# Patient Record
(demographics unavailable — no encounter records)

---

## 2018-03-29 NOTE — RADIOLOGY REPORT (SQ)
EXAM DESCRIPTION:  ANKLE RIGHT COMPLETE



COMPLETED DATE/TIME:  3/29/2018 11:55 am



REASON FOR STUDY:  s/p fall



COMPARISON:  None.



NUMBER OF VIEWS:  Three views.



TECHNIQUE:  AP, lateral, and oblique radiographic images acquired of the right ankle.



LIMITATIONS:  None.



FINDINGS:  MINERALIZATION: Normal.

BONES: No acute fracture or dislocation.  No worrisome bone lesions.

JOINTS: No effusions.

SOFT TISSUES: No soft tissue swelling. No foreign body.

OTHER: No other significant finding.



IMPRESSION:  NEGATIVE STUDY OF THE RIGHT ANKLE. NO RADIOGRAPHIC EVIDENCE OF ACUTE INJURY.



TECHNICAL DOCUMENTATION:  JOB ID:  [id number]

 2011 [employer]- All Rights Reserved



Reading location - IP/workstation name: [email or web address]

## 2018-03-29 NOTE — ER DOCUMENT REPORT
ED General





- General


Chief Complaint: Ankle Injury


Stated Complaint: FOOT PAIN


Time Seen by Provider: 03/29/18 11:25


Mode of Arrival: Ambulatory


Information source: Patient, Parent


Notes: 





Patient is a 12-year-old female who is complaining of right foot and ankle pain 

that started 2 days ago.  She states she was running and felt a pop and a 

tingling sensation to her ankle.  She has been ambulatory but occasionally pain 

is worse with ambulation.  She denies any swelling or bruising.  She has not 

taken any medication for this.  Otherwise doing well.


TRAVEL OUTSIDE OF THE [place] IN LAST 30 DAYS: No





- Related Data


Allergies/Adverse Reactions: 


 





No Known Allergies Allergy (Verified 03/29/18 10:34)


 











Past Medical History





- General


Information source: Patient





- Social History


Smoking Status: Never Smoker


Chew tobacco use (# tins/day): No


Frequency of alcohol use: None


Drug Abuse: None


Family History: Reviewed & Not Pertinent


Patient has suicidal ideation: No


Patient has homicidal ideation: No





- Past Medical History


Cardiac Medical History: 


   Denies: Hx Coronary Artery Disease, Hx Heart Attack, Hx Hypertension


Pulmonary Medical History: 


   Denies: Hx Asthma, Hx Bronchitis, Hx COPD, Hx Pneumonia


Neurological Medical History: Denies: Hx Cerebrovascular Accident, Hx Seizures


Renal/ Medical History: Denies: Hx Peritoneal Dialysis


GI Medical History: Denies: Hx Hepatitis, Hx Hiatal Hernia, Hx Ulcer


Musculoskeltal Medical History: Denies Hx Arthritis


Psychiatric Medical History: Reports: Hx Attention Deficit Hyperactivity 

Disorder


Infectious Medical History: Denies: Hx Hepatitis, Hx MRSA


Past Surgical History: Reports: Hx Orthopedic Surgery - left wrist, Hx 

Tonsillectomy - adenoids.  Denies: Hx Mastectomy, Hx Open Heart Surgery, Hx 

Pacemaker





- Immunizations


Immunizations up to date: Yes


Hx Diphtheria, Pertussis, Tetanus Vaccination: Yes





Review of Systems





- Review of Systems


Constitutional: No symptoms reported


EENT: No symptoms reported


Cardiovascular: No symptoms reported


Respiratory: No symptoms reported


Gastrointestinal: No symptoms reported


Genitourinary: No symptoms reported


Female Genitourinary: No symptoms reported


Musculoskeletal: See HPI


Skin: No symptoms reported


Hematologic/Lymphatic: No symptoms reported


Neurological/Psychological: No symptoms reported





Physical Exam





- Vital signs


Vitals: 


 











Temp Pulse Resp BP Pulse Ox


 


 98.4 F   74   16   118/71   100 


 


 03/29/18 10:40  03/29/18 10:40  03/29/18 10:40  03/29/18 10:40  03/29/18 10:40














- Notes


Notes: 





PHYSICAL EXAM:


CONSTITUTIONAL: Alert and oriented, well-appearing and in no acute distress. 


HENT: Normocephalic, atraumatic.  Trachea midline. Uvula midline. Moist mucous 

membranes.


EYES: Pupils equal round and reactive to light, EOM intact. Sclera anicteric, 

conjunctiva are normal. No entrapment. 


NECK: supple without lymphadenopathy. No midline tenderness or paraspinous 

muscle spasms. No step-offs or deformities. ROM intact. Negative Kernig's and 

negative Brudzinski's. 


HEART: Regular rate and rhythm without murmurs. 


LUNGS: CTAB and equal. No wheezes, rales or rhonchi. 


GI: Normactive bowel sounds. Abdomen is soft, nontender, non-distended. No 

organomegaly. no CVAT. No rebound or guarding. 


EXTREMITIES: mild bony tenderness to medial and lateral malleolus of right 

ankle without erythema, edema, ecchymosis or deformity. Normal range of motion, 

no pitting edema. No cyanosis. Cap Refill <3 seconds.


NEURO: Cranial nerves grossly intact. Normal sensory/motor exams.


PSYCH: Normal mood, normal affect. 


SKIN: Warm and dry. Normal turgor. No rashes or lesions noted.





Course





- Re-evaluation


Re-evalutation: 





03/29/18 11:26


Patient seen and examined. Well appearing, well hydrated, VSS, NAD. 

Neurovascular intact, no obvious deformities. Patient is ambulatory in ED. Will 

obtain xrays to r/o fracture. 





03/29/18 12:23


Reviewed imaging studies and results - no acute fracture or dislocation. 

Discussed results with mother. Will give ACE wrap/crutches for comfort. 

Discussed weight bearing as tolerated and RICE instructions. Recommend NSAIDs 

as needed for pain.





At this time, will discharge with return precautions and follow-up 

recommendations. Verbal discharge instructions given at the bedside and 

opportunity for questions given. Medication warnings reviewed. Patient is in 

agreement with this plan and has verbalized understanding of return precautions 

and the need for primary care follow-up in the next 24-72 hours. 











- Vital Signs


Vital signs: 


 











Temp Pulse Resp BP Pulse Ox


 


 98.4 F   74   16   118/71   100 


 


 03/29/18 10:40  03/29/18 10:40  03/29/18 10:40  03/29/18 10:40  03/29/18 10:40














- Diagnostic Test


Radiology reviewed: Image reviewed, Reports reviewed





Discharge





- Discharge


Clinical Impression: 


Right ankle sprain


Qualifiers:


 Encounter type: initial encounter Involved ligament of ankle: other ligament 

Qualified Code(s): S93.491A - Sprain of other ligament of right ankle, initial 

encounter





Condition: Stable


Disposition: HOME, SELF-CARE


Instructions:  Ace Wrap (Critical access hospital), Use of Crutches (Critical access hospital), Ice & Elevation (Critical access hospital), 

Sprained Ankle (Critical access hospital)


Forms:  Return to School


Referrals: 


TALON JACKSON MD [Primary Care Provider] - Follow up in 1 week

## 2018-05-09 NOTE — ER DOCUMENT REPORT
ED GI/





- General


Chief Complaint: Abdominal Pain


Stated Complaint: SIDE PAIN


Time Seen by Provider: 05/09/18 01:30


Mode of Arrival: Ambulatory


Information source: Patient


Notes: 





Patient is a 12-year-old female who presents with complaints of abdominal pain.

  Patient and patient's mother reports that on Sunday she started having right 

lower quadrant pain with nausea and vomiting and low-grade fever.  Patient 

reports that she started having diarrhea the following day.  Patient states 

that nothing makes the pain worse however nothing makes the pain better either.

  Patient has had an appetite and has been eating meals although she states 

that she typically vomits after most meals.  Patient denies any urinary 

frequency, urgency or dysuria.  Per patient's mother patient has past medical 

history of ADHD, and no surgical history.


TRAVEL OUTSIDE OF THE [place] IN LAST 30 DAYS: No





- Related Data


Allergies/Adverse Reactions: 


 





No Known Allergies Allergy (Verified 03/29/18 10:34)


 











Past Medical History





- General


Information source: Patient, Parent





- Social History


Smoking Status: Never Smoker


Cigarette use (# per day): No


Chew tobacco use (# tins/day): No


Smoking Education Provided: No


Family History: Reviewed & Not Pertinent





- Past Medical History


Cardiac Medical History: 


   Denies: Hx Coronary Artery Disease, Hx Heart Attack, Hx Hypertension


Pulmonary Medical History: 


   Denies: Hx Asthma, Hx Bronchitis, Hx COPD, Hx Pneumonia


Neurological Medical History: Denies: Hx Cerebrovascular Accident, Hx Seizures


Renal/ Medical History: Denies: Hx Peritoneal Dialysis


GI Medical History: Denies: Hx Hepatitis, Hx Hiatal Hernia, Hx Ulcer


Musculoskeltal Medical History: Denies Hx Arthritis


Psychiatric Medical History: Reports: Hx Attention Deficit Hyperactivity 

Disorder


Infectious Medical History: Denies: Hx Hepatitis, Hx MRSA


Past Surgical History: Reports: Hx Orthopedic Surgery - left wrist, Hx 

Tonsillectomy - adenoids.  Denies: Hx Mastectomy, Hx Open Heart Surgery, Hx 

Pacemaker





- Immunizations


Immunizations up to date: Yes


Hx Diphtheria, Pertussis, Tetanus Vaccination: Yes





Review of Systems





- Review of Systems


Constitutional: See HPI, Fever


EENT: No symptoms reported


Cardiovascular: No symptoms reported


Respiratory: No symptoms reported


Gastrointestinal: See HPI


Genitourinary: No symptoms reported


Female Genitourinary: No symptoms reported


Musculoskeletal: No symptoms reported


Skin: No symptoms reported


Hematologic/Lymphatic: No symptoms reported


Neurological/Psychological: No symptoms reported





Physical Exam





- Vital signs


Vitals: 


 











Temp Pulse Resp BP Pulse Ox


 


 98.0 F   93   18   131/85 H  98 


 


 05/09/18 00:50  05/09/18 00:50  05/09/18 00:50  05/09/18 00:50  05/09/18 00:50














- Notes


Notes: 





PHYSICAL EXAMINATION:





GENERAL: Well-appearing, well-nourished and in no acute distress.





HEAD: Atraumatic, normocephalic.





EYES: Pupils equal round and reactive to light, conjunctiva are normal.





ENT: Nares patent, oropharynx clear without exudates.  Moist mucous membranes.





NECK: Normal range of motion, supple without lymphadenopathy





LUNGS: Breath sounds clear to auscultation bilaterally and equal.  No wheezes 

rales or rhonchi.





HEART: Regular rate and rhythm without murmurs





ABDOMEN: Soft, nontender, nondistended abdomen.  Bowel sounds active.  No 

guarding, no rebound.  No masses appreciated.





Female : deferred





Musculoskeletal: Normal range of motion, no pitting or edema.  No cyanosis.





NEUROLOGICAL: Cranial nerves grossly intact.  Normal speech.  Normal sensory, 

motor exams





PSYCH: Normal mood, normal affect.





SKIN: Warm, Dry, normal turgor, no rashes or lesions noted.





Course





- Re-evaluation


Re-evalutation: 


12-year-old female with 3 day history of low-grade fever, nausea, vomiting, 

diarrhea and abdominal pain mostly to the right lower quadrant.  Patient 

appears well, non-toxic exam is completely unremarkable.  Patient's mother 

states that she feels patient has appendicitis.  I have a very low suspicion of 

this due to patient's exam.  Will draw CBC, basic metabolic panel and 

urinalysis.  Mother and patient agree to this plan.





CBC, chemistry and urinalysis are unremarkable.  Patient reexamined and her 

exam is unremarkable.  Very low suspicion of appendicitis.  Patient likely has 

a viral illness.  Mother given strict ED return precautions.  Other reports 

that she understands return precautions.





- Vital Signs


Vital signs: 


 











Temp Pulse Resp BP Pulse Ox


 


 98.4 F   91   20   123/81   97 


 


 05/09/18 04:00  05/09/18 04:00  05/09/18 04:00  05/09/18 04:00  05/09/18 04:00














- Laboratory


Result Diagrams: 


 05/09/18 03:00





 05/09/18 03:00


Laboratory results interpreted by me: 


 











  05/09/18 05/09/18 05/09/18





  03:00 03:00 03:00


 


Seg Neutrophils %  35.2 L  


 


Lymphocytes %  51.6 H  


 


Creatinine   0.49 L 


 


Calcium   10.4 H 


 


Urine Urobilinogen    2.0 H














Discharge





- Discharge


Clinical Impression: 


 Vomiting and diarrhea





Abdominal pain


Qualifiers:


 Abdominal location: right lower quadrant Qualified Code(s): R10.31 - Right 

lower quadrant pain





Condition: Stable


Disposition: HOME, SELF-CARE


Instructions:  Abdominal Pain ([facility]), Antinausea Medication ([facility]), Observation 

for Appendicitis ([facility])


Additional Instructions: 


Observation for Appendicitis





     At this time, the abdominal pain does not seem to be appendicitis. Our 

next "test" will be passage of time.  If you have early appendicitis, signs 

will appear to help us make the diagnosis.


     Most of the time, the pain goes away.  In these cases, the pain is usually 

due to a virus in the lymph glands near the appendix, or due to an ovarian cyst 

or ovulation.


     Unless the pain is gone, you should come back for a recheck.  This is 

usually done in 8 to 12 hours.  Be sure you understand your follow-up 

instructions.


     Come back immediately if: 


(1) the pain becomes much more severe and sharply increases with movement or 

coughing, 


(2) vomiting becomes frequent, 


(3) there is blood in the vomit, urine, or bowel movements, 


(4) there are shaking chills or fever, or 


(5) the abdomen becomes more distended or swollen.





*****Please return to the emergency department if your child develops any of 

the above symptoms.  Her blood work and urine were completely normal today.  

Please follow-up with her pediatrician in the next 2-3 days if she continues to 

have abdominal pain.*****


Prescriptions: 


Ondansetron [Zofran Odt 4 mg Tablet] 1 tab PO Q4H PRN #15 tab.rapdis


 PRN Reason: For Nausea/Vomiting


Forms:  Parent Work Note, Return to School


Referrals: 


TALON JACKSON MD [Primary Care Provider] - Follow up as needed

## 2018-10-20 NOTE — ER DOCUMENT REPORT
ED Extremity Problem, Lower





- General


Chief Complaint: Ankle Injury


Stated Complaint: ANKLE INJURY


Time Seen by Provider: 10/20/18 23:10


Mode of Arrival: Wheelchair


Information source: Patient


TRAVEL OUTSIDE OF THE [place] IN LAST 30 DAYS: No





- HPI


Patient complains to provider of: Injury, Pain, Swelling


Location: Ankle


Occurred: Just prior to arrival


Where: Outdoors


Onset/Duration: Sudden


Quality of pain: Achy


Severity: Moderate


Pain Level: 3


Context: Direct blow, Twisted


Recent injury: Yes


Associated symptoms: Painful ambulation


Exacerbated by: Movement


Relieved by: Nothing


Notes: 





Patient is a 13-year-old female brought to the emergency room by mother for 

complaints of injury to her left ankle that occurred just prior to arrival, 

patient was a spectator on a hay ride, a character dressed and a clown costume 

came on up to the hayride and was attempting to scare another spectator when 

the client was accidentally jumping up and down on patient's left foot, she 

reports painful ambulation and movements, with swelling, denies pain or injury 

elsewhere





- Related Data


Allergies/Adverse Reactions: 


 





No Known Allergies Allergy (Verified 03/29/18 10:34)


 











Past Medical History





- General


Information source: Patient





- Social History


Smoking Status: Never Smoker


Family History: Reviewed & Not Pertinent





- Past Medical History


Cardiac Medical History: 


   Denies: Hx Coronary Artery Disease, Hx Heart Attack, Hx Hypertension


Pulmonary Medical History: 


   Denies: Hx Asthma, Hx Bronchitis, Hx COPD, Hx Pneumonia


Neurological Medical History: Denies: Hx Cerebrovascular Accident, Hx Seizures


Renal/ Medical History: Denies: Hx Peritoneal Dialysis


GI Medical History: Denies: Hx Hepatitis, Hx Hiatal Hernia, Hx Ulcer


Musculoskeletal Medical History: Denies Hx Arthritis


Psychiatric Medical History: Reports: Hx Attention Deficit Hyperactivity 

Disorder


Infectious Medical History: Denies: Hx Hepatitis, Hx MRSA


Past Surgical History: Reports: Hx Orthopedic Surgery - left wrist, Hx 

Tonsillectomy - adenoids.  Denies: Hx Mastectomy, Hx Open Heart Surgery, Hx 

Pacemaker





- Immunizations


Immunizations up to date: Yes


Hx Diphtheria, Pertussis, Tetanus Vaccination: Yes





Review of Systems





- Review of Systems


Constitutional: No symptoms reported


EENT: No symptoms reported


Cardiovascular: No symptoms reported


Respiratory: No symptoms reported


Gastrointestinal: No symptoms reported


Genitourinary: No symptoms reported


Female Genitourinary: No symptoms reported


Musculoskeletal: See HPI


Skin: No symptoms reported


Hematologic/Lymphatic: No symptoms reported


Neurological/Psychological: No symptoms reported


-: Yes All other systems reviewed and negative





Physical Exam





- Vital signs


Vitals: 


 











Temp Pulse Resp BP Pulse Ox


 


 98.8 F   74   16   125/74   99 


 


 10/20/18 22:09  10/20/18 22:09  10/20/18 22:09  10/20/18 22:09  10/20/18 22:09














- Notes


Notes: 





- General


General appearance: Appears well, Alert


In distress: None





- HEENT


Head: Normocephalic, Atraumatic


Eyes: Normal


Conjunctiva: Normal


Extraocular movements intact: Yes


Eyelashes: Normal


Pupils: PERRL





- Respiratory


Respiratory status: No respiratory distress





- Cardiovascular


Rhythm: Regular





- Abdominal


Inspection: Normal





- Back


Back: Normal





- Extremities


General upper extremity: Normal inspection


General lower extremity: Left ankle with swelling and tenderness in the medial 

malleolus, distal sensation and motor is intact with 2+ DP pulses





- Neurological


Neuro grossly intact: Yes


Orientation: AAOx4


[place] Coma Scale Eye Opening: Spontaneous


[place] Coma Scale Verbal: Oriented


Kellen Coma Scale Motor: Obeys Commands


[place] Coma Scale Total: 15





- Psychological


Associated symptoms: Normal affect, Normal mood





- Skin


Skin Temperature: Warm


Skin Moisture: Dry


Skin Color: Normal





Course





- Re-evaluation


Re-evalutation: 





10/21/18 00:45


Imaging findings unremarkable, symptoms consistent with ankle sprain and 

contusion, patient placed in posterior ankle splint and provided with crutches, 

advised to ice and elevate, Tylenol or Motrin as needed for pain, follow-up 

with orthopedics in 2-3 days or return if symptoms worsen, patient and mother 

acknowledge understanding and agreement with this plan





- Vital Signs


Vital signs: 


 











Temp Pulse Resp BP Pulse Ox


 


 98.8 F   74   16   125/74   99 


 


 10/20/18 22:09  10/20/18 22:09  10/20/18 22:09  10/20/18 22:09  10/20/18 22:09














- Diagnostic Test


Radiology reviewed: Image reviewed, Reports reviewed





Procedures





- Immobilization


  ** Left Ankle


Time completed: 23:16


Pre-Proc Neuro Vasc Exam: Normal


Immobilizer type: Posterior ankle


Performed by: PCT


Post-Proc Neuro Vasc Exam: Normal


Alignment checked and good: Yes





Discharge





- Discharge


Clinical Impression: 


 Ankle sprain





Condition: Stable


Disposition: HOME, SELF-CARE


Instructions:  Ankle Stirrup Splint ([facility]), Use of Crutches ([facility]), Ice & 

Elevation ([facility]), Ice Packs ([facility]), Splint Precautions ([facility]), Sprained Ankle ([facility])


Additional Instructions: 


Follow up with your primary care provider and an orthopedic surgeon in one to 2 

days.  Return to the emergency room immediately if symptoms worsen or any 

additional concerns.  Ice and elevate the affected extremity.  Limit 

weightbearing.


Referrals: 


TALON JACKSON MD [Primary Care Provider] - Follow up as needed


BEULAH CHIRINOS MD [ACTIVE STAFF] - Follow up as needed

## 2018-10-20 NOTE — RADIOLOGY REPORT (SQ)
Right ankle three views



HISTORY: Right ankle pain.



FINDINGS: Patient is skeletally immature. Bony alignment is

anatomic. No fracture or dislocation. Ankle mortise is not

widened. Soft tissues are unremarkable.



IMPRESSION:



No fracture.

## 2019-01-08 NOTE — RADIOLOGY REPORT (SQ)
EXAM DESCRIPTION: 



XR ABDOMEN 1 VIEW (KUB)



COMPLETED DATE/TME:  01/08/2019 22:59



CLINICAL HISTORY: 



13 years, Female, sharp mid abd pain x2 weeks



COMPARISON:

None.



NUMBER OF VIEWS:

1



TECHNIQUE:

AP abdomen



LIMITATIONS:

None.



FINDINGS:



Evaluation for free air limited on a supine view. The bowel gas

pattern is nonspecific. Abundant stool in the colon.



IMPRESSION:



Abundant stool in the colon

 



copyright 2011 [employer]- All Rights Reserved

## 2019-01-08 NOTE — ER DOCUMENT REPORT
ED General





- General


Chief Complaint: Abdominal Pain


Stated Complaint: RIGHT SIDE PAIN


Time Seen by Provider: 01/08/19 12:46


Notes: 





13-year-old female here with mother who states that she has had right-sided 

abdominal pain and flank pain for the past 2 weeks.  Pain is intermittent.  Pain

is very minimal when she is sitting still but hurts much more when she performs 

movements such as torso twisting and bending over to her right side.  The pain 

is not worse with eating.  Mother has tried Tylenol and then when this did not 

work switched to Motrin but has not tried both at the same time.  No dysuria 

hematuria frequency hesitancy nausea cough congestion runny nose but has not 

vomited twice.


TRAVEL OUTSIDE OF THE [place] IN LAST 30 DAYS: No





- Related Data


Allergies/Adverse Reactions: 


                                        





No Known Allergies Allergy (Verified 01/08/19 11:50)


   











Past Medical History





- Social History


Smoking Status: Never Smoker


Chew tobacco use (# tins/day): No


Frequency of alcohol use: None


Drug Abuse: None


Family History: Reviewed & Not Pertinent


Patient has suicidal ideation: No


Patient has homicidal ideation: No





- Past Medical History


Cardiac Medical History: 


   Denies: Hx Coronary Artery Disease, Hx Heart Attack, Hx Hypertension


Pulmonary Medical History: 


   Denies: Hx Asthma, Hx Bronchitis, Hx COPD, Hx Pneumonia


Neurological Medical History: Denies: Hx Cerebrovascular Accident, Hx Seizures


Renal/ Medical History: Denies: Hx Peritoneal Dialysis


GI Medical History: Denies: Hx Hepatitis, Hx Hiatal Hernia, Hx Ulcer


Musculoskeletal Medical History: Denies Hx Arthritis


Psychiatric Medical History: Reports: Hx Attention Deficit Hyperactivity 

Disorder


Infectious Medical History: Denies: Hx Hepatitis, Hx MRSA


Past Surgical History: Reports: Hx Orthopedic Surgery - left wrist, Hx 

Tonsillectomy - adenoids.  Denies: Hx Mastectomy, Hx Open Heart Surgery, Hx 

Pacemaker





- Immunizations


Immunizations up to date: Yes


Hx Diphtheria, Pertussis, Tetanus Vaccination: Yes





Review of Systems





- Review of Systems


Notes: 





See history of present illness for pertinent positive review of systems; 

otherwise all review of systems have been reviewed and are negative





Physical Exam





- Vital signs


Vitals: 





                                        











Temp Pulse Resp BP Pulse Ox


 


 98.3 F   82   16   122/61   99 


 


 01/08/19 11:57  01/08/19 11:57  01/08/19 11:57  01/08/19 11:57  01/08/19 11:57














- Notes


Notes: 





PHYSICAL EXAMINATION:





GENERAL: Well-appearing and in no acute distress.





HEAD: Atraumatic, normocephalic.





EYES: Pupils equal round and reactive to light, extraocular movements intact, 

sclera anicteric, conjunctiva are normal.





ENT: nares patent, oropharynx clear without exudates.  Moist mucous membranes.





NECK: Normal range of motion, supple without lymphadenopathy





LUNGS: CTAB and equal.  No wheezes rales or rhonchi.





HEART: Regular rate and rhythm without murmurs





ABDOMEN: Soft, minimal epigastric tenderness and mild to moderate right flank 

TTP.  No facial grimacing/wincing upon palpation.  No guarding, no rebound.  No 

Morrell sign.  No peritoneal signs.





EXTREMITIES: Normal range of motion, no pitting edema.  No cyanosis.





NEUROLOGICAL: Cranial nerves grossly intact. Normal sensory/motor exams.





PSYCH: Normal mood, normal affect.





SKIN: Warm, Dry, normal turgor, no rashes or lesions noted





Course





- Re-evaluation


Re-evalutation: 





01/08/19 12:57


MEDICAL DECISION MAKING:


Concern for musculoskeletal strain pyelonephritis UTI gallbladder pathology


Discussed with the mother this is likely musculoskeletal strain but discussed 

obtaining labs and ultrasound


Child does not want any of this performed and ultimately discussed with the 

mother obtaining these


She reports that she will follow-up with the PCP and it does not wants anything 

done at this time, not even testing urine


Discussed with mother that she can return anytime if she changes her mind


Mother understands and agrees to the plan of care





- Vital Signs


Vital signs: 





                                        











Temp Pulse Resp BP Pulse Ox


 


 98.3 F   82   16   122/61   99 


 


 01/08/19 11:57  01/08/19 11:57  01/08/19 11:57  01/08/19 11:57  01/08/19 11:57














Discharge





- Discharge


Clinical Impression: 


 Right sided abdominal pain





Condition: Good


Disposition: HOME, SELF-CARE


Additional Instructions: 


You declined blood work urinalysis and ultrasound.  Please return if you change 

your mind and we would be happy to see you at any time.  Use Tylenol and Motrin 

together at the same time.  You were seen in the emergency department at [facility]. If you were given any sedating medications, be sure not to 

operate heavy machinery (example - driving) and be sure you are not too sedated 

to walk appropriately.  Please followup with your primary physician in the next 

few days for further management/evaluation.  Please return to the emergency 

department for worsening of symptoms or any symptom that you deem to be 

concerning or life-threatening.  Thank you for allowing us to be part of your 

care.


Referrals: 


MEENA VALDEZ PA-C [Primary Care Provider] - Follow up as needed

## 2019-01-09 NOTE — ER DOCUMENT REPORT
ED GI/





- General


Chief Complaint: Abdominal Pain


Stated Complaint: ABDOMINAL PAIN


Time Seen by Provider: 01/08/19 22:51


Notes: 


Patient is a 13-year-old female that comes to the emergency department for chief

complaint of abdominal pain intermittently for the past 2 weeks.  Worse over the

past couple of days per mom and patient.  Mom states there is seen here earlier 

today, instructed to take Tylenol and ibuprofen as needed for pain at the same 

time, mom states she tried this but patient was still holding her abdomen and 

crying this evening so she brought her back.  Mom states the patient has had 

intermittent vomiting but none today.  No dysuria, no flank pain, no 

fever/chills, patient able to eat without difficulty reportedly.  Patient had a 

bowel movement earlier today which appeared normal per patient, no blood, not 

hard, not diarrhea.  No fever reported.  Has had tonsillectomy.  Past medical 

history of ADHD, medicated.


TRAVEL OUTSIDE OF THE [place] IN LAST 30 DAYS: No





- Related Data


Allergies/Adverse Reactions: 


                                        





No Known Allergies Allergy (Verified 01/08/19 11:50)


   











Past Medical History





- General


Information source: Patient, Parent





- Social History


Smoking Status: Never Smoker


Chew tobacco use (# tins/day): No


Frequency of alcohol use: None


Drug Abuse: None


Lives with: Family


Family History: Reviewed & Not Pertinent


Patient has suicidal ideation: No


Patient has homicidal ideation: No





- Past Medical History


Cardiac Medical History: 


   Denies: Hx Coronary Artery Disease, Hx Heart Attack, Hx Hypertension


Pulmonary Medical History: 


   Denies: Hx Asthma, Hx Bronchitis, Hx COPD, Hx Pneumonia


Neurological Medical History: Denies: Hx Cerebrovascular Accident, Hx Seizures


Renal/ Medical History: Denies: Hx Peritoneal Dialysis


GI Medical History: Denies: Hx Hepatitis, Hx Hiatal Hernia, Hx Ulcer


Musculoskeletal Medical History: Denies Hx Arthritis


Psychiatric Medical History: Reports: Hx Attention Deficit Hyperactivity 

Disorder


Infectious Medical History: Denies: Hx Hepatitis, Hx MRSA


Past Surgical History: Reports: Hx Orthopedic Surgery - left wrist, Hx 

Tonsillectomy - adenoids.  Denies: Hx Mastectomy, Hx Open Heart Surgery, Hx 

Pacemaker





- Immunizations


Immunizations up to date: Yes


Hx Diphtheria, Pertussis, Tetanus Vaccination: Yes





Review of Systems





- Review of Systems


Constitutional: No symptoms reported


EENT: No symptoms reported


Cardiovascular: No symptoms reported


Respiratory: No symptoms reported


Gastrointestinal: See HPI


Genitourinary: No symptoms reported


Female Genitourinary: No symptoms reported


Musculoskeletal: No symptoms reported


Skin: No symptoms reported


Hematologic/Lymphatic: No symptoms reported


Neurological/Psychological: No symptoms reported





Physical Exam





- Vital signs


Vitals: 


                                        











Temp Pulse Resp BP Pulse Ox


 


 98.5 F   80   20   122/68   98 


 


 01/08/19 21:53  01/08/19 21:53  01/08/19 21:53  01/08/19 21:53  01/08/19 21:53














- Notes


Notes: 





GENERAL: Alert, interacts well. No acute distress.


HEAD: Normocephalic, atraumatic.


EYES: Pupils equal, round, and reactive to light. Extraocular movements intact.


ENT: Oral mucosa moist, tongue midline. Oropharynx unremarkable. Airway patent. 

Nares patent, no nasal septal hematoma, TM's intact.


NECK: Full range of motion. Supple. Trachea midline.


LUNGS: Clear to auscultation bilaterally, no wheezes, rales, or rhonchi. No 

respiratory distress.


HEART: Regular rate and rhythm. No murmur


ABDOMEN: Slightly distended.  Minimal generalized tenderness.  Nonspecific.  No 

guarding.  No McBurney's tenderness.


GENITOURINARY: Deferred


EXTREMITIES: Moves all 4 extremities spontaneously. No edema, normal radial and 

dorsalis pedis pulses bilaterally. No cyanosis.


BACK: no cervical, thoracic, lumbar midline tenderness. No saddle anesthesia, 

normal distal neurovascular exam. 


NEUROLOGICAL: Alert and oriented x3. Normal speech. [cranial nerves II through 

XII grossly intact]. 


PSYCH: Normal affect, normal mood.


SKIN: Warm, dry, normal turgor. No rashes or lesions noted.





Course





- Re-evaluation


Re-evalutation: 


Slightly distended but very benign abdomen with minimal generalized tenderness. 

No specific McBurney's point tenderness.  Symptoms going on for 2 weeks.  

Patient tolerating p.o. without difficulty per mom.  Unremarkable vital signs.  

Patient is nontoxic in appearance, smiling, denying any current pain.





Patient started having pain again, treated with Toradol and Zofran, given IV 

fluids, symptoms completely resolved.





CBC unremarkable, chemistry unremarkable.  Urine shows elevated specific 

gravity, no infection.  KUB showing large stool retention throughout without 

evidence of perforation or obstruction.  This is consistent with patient's 

reported symptoms.





Reexamined patient.  Very benign abdomen.  No current symptoms.  Discussed 

results in detail with patient and mother.  Decision was made to proceed with 

oral stool softeners, improved diet and hydration.  Discussed expectations and 

return precautions in detail with patient and mother.  They state understanding 

and agreement.  Mom requesting Toradol for patient to take at home if needed.





- Vital Signs


Vital signs: 


                                        











Temp Pulse Resp BP Pulse Ox


 


 98.5 F   80   20   122/68   98 


 


 01/08/19 21:53  01/08/19 21:53  01/08/19 21:53  01/08/19 21:53  01/08/19 21:53














- Laboratory


Result Diagrams: 


                                 01/08/19 23:37





                                 01/08/19 23:37


Laboratory results interpreted by me: 


                                        











  01/08/19





  23:37


 


Seg Neutrophils %  38.6 L


 


Lymphocytes %  52.8 H


 


Absolute Lymphocytes  5.2 H














Discharge





- Discharge


Clinical Impression: 


Abdominal pain


Qualifiers:


 Abdominal location: generalized Qualified Code(s): R10.84 - Generalized 

abdominal pain





Condition: Stable


Disposition: HOME, SELF-CARE


Additional Instructions: 


Your laboratory workup shows some dehydration but otherwise does not show any 

concerning findings.


Your x-ray shows no obstruction or other concerning findings but does show 

abundant stool throughout the colon.


Recommendation is to use the magnesium citrate initially (i.e. drink 1/4, wait 

several hours, if no results drink another 1/4, etc). 


I also recommend the MiraLAX stool softener for the next several days.


Continue to hydrate, increase fiber in diet.  Follow-up with pediatrics for 

additional management.





Return for any concerning symptoms including severe swelling of the abdomen, 

increased pain, return for persistent vomiting, fever/chills, or any other 

concerning or worsening symptoms.


Prescriptions: 


Ketorolac Tromethamine [Toradol 10 mg Tablet] 10 mg PO Q8HP PRN #12 tablet


 PRN Reason: 


Ondansetron [Zofran Odt 4 mg Tablet] 1 - 2 tab PO Q4H PRN #15 tab.rapdis


 PRN Reason: For Nausea/Vomiting


Polyethylene Glycol 3350 [Miralax Powder 17 gm/Packet] 1 packet PO DAILY #1 pkg


Forms:  Return to School


Referrals: 


TALON JACKSON MD [Primary Care Provider] - Follow up as needed